# Patient Record
Sex: FEMALE | Race: WHITE | URBAN - METROPOLITAN AREA
[De-identification: names, ages, dates, MRNs, and addresses within clinical notes are randomized per-mention and may not be internally consistent; named-entity substitution may affect disease eponyms.]

---

## 2022-06-17 ENCOUNTER — APPOINTMENT (RX ONLY)
Dept: URBAN - METROPOLITAN AREA CLINIC 141 | Facility: CLINIC | Age: 37
Setting detail: DERMATOLOGY
End: 2022-06-17

## 2022-06-17 DIAGNOSIS — L71.0 PERIORAL DERMATITIS: ICD-10-CM | Status: INADEQUATELY CONTROLLED

## 2022-06-17 PROCEDURE — 99204 OFFICE O/P NEW MOD 45 MIN: CPT

## 2022-06-17 PROCEDURE — ? PRESCRIPTION

## 2022-06-17 PROCEDURE — ? TREATMENT REGIMEN

## 2022-06-17 PROCEDURE — ? COUNSELING

## 2022-06-17 RX ORDER — DOXYCYCLINE 100 MG/1
CAPSULE ORAL
Qty: 180 | Refills: 0 | Status: ERX | COMMUNITY
Start: 2022-06-17

## 2022-06-17 RX ORDER — CLINDAMYCIN PHOSPHATE 10 MG/ML
LOTION TOPICAL
Qty: 60 | Refills: 3 | Status: ERX | COMMUNITY
Start: 2022-06-17

## 2022-06-17 RX ADMIN — DOXYCYCLINE: 100 CAPSULE ORAL at 00:00

## 2022-06-17 RX ADMIN — CLINDAMYCIN PHOSPHATE: 10 LOTION TOPICAL at 00:00

## 2022-06-17 ASSESSMENT — LOCATION SIMPLE DESCRIPTION DERM
LOCATION SIMPLE: LEFT CHEEK
LOCATION SIMPLE: RIGHT LIP
LOCATION SIMPLE: RIGHT CHEEK
LOCATION SIMPLE: LEFT LIP
LOCATION SIMPLE: CHIN

## 2022-06-17 ASSESSMENT — LOCATION DETAILED DESCRIPTION DERM
LOCATION DETAILED: RIGHT MEDIAL MALAR CHEEK
LOCATION DETAILED: LEFT LOWER CUTANEOUS LIP
LOCATION DETAILED: LEFT UPPER CUTANEOUS LIP
LOCATION DETAILED: LEFT CHIN
LOCATION DETAILED: RIGHT UPPER CUTANEOUS LIP
LOCATION DETAILED: LEFT MEDIAL MALAR CHEEK
LOCATION DETAILED: RIGHT LOWER CUTANEOUS LIP

## 2022-06-17 ASSESSMENT — LOCATION ZONE DERM
LOCATION ZONE: LIP
LOCATION ZONE: FACE

## 2022-06-17 NOTE — PROCEDURE: TREATMENT REGIMEN
Initiate Treatment: Doxycycline 100 mg BID x 3 months\\nClindamycin 1% lotion to active rash 1-2 times a day as needed\\nWash face with gentle cleanser BID\\nApply non-comedogenic moisturizer to face BID
Plan: Patient's facial rash appears most consistent with perioral dermatitis. She does also have some inflammatory lesions on her chest, so she may also have acne. Recommend starting doxycycline and topical clindamycin and will re-evaluate in 3 months. Discussed that her birth control could be contributing but due to other health issues she cannot be on a form of birth control that may be helpful for acne. Discussed other potential triggers for perioral dermatitis as well.
Detail Level: Zone

## 2022-06-17 NOTE — HPI: PIMPLES (ACNE)
Is This A New Presentation, Or A Follow-Up?: Acne
Additional Comments (Use Complete Sentences): Patient started an oral birth control pill that only has progesterone about a year ago. She wasn't able to go on the combination OCP due to high blood pressure. The birth control pill and the rash started around similar times. She has tried hydrocortisone on her face for this rash without much change. She has been treated for acne before with prescription antibiotics and topicals but she doesn't quite remember what.